# Patient Record
Sex: FEMALE | Race: OTHER | HISPANIC OR LATINO | URBAN - METROPOLITAN AREA
[De-identification: names, ages, dates, MRNs, and addresses within clinical notes are randomized per-mention and may not be internally consistent; named-entity substitution may affect disease eponyms.]

---

## 2023-07-26 ENCOUNTER — EMERGENCY (EMERGENCY)
Facility: HOSPITAL | Age: 33
LOS: 1 days | Discharge: HOME | End: 2023-07-26
Attending: EMERGENCY MEDICINE
Payer: MEDICAID

## 2023-07-26 VITALS
WEIGHT: 158.73 LBS | DIASTOLIC BLOOD PRESSURE: 87 MMHG | OXYGEN SATURATION: 97 % | SYSTOLIC BLOOD PRESSURE: 128 MMHG | TEMPERATURE: 98 F | RESPIRATION RATE: 16 BRPM | HEART RATE: 87 BPM

## 2023-07-26 VITALS
HEART RATE: 74 BPM | TEMPERATURE: 99 F | OXYGEN SATURATION: 99 % | RESPIRATION RATE: 17 BRPM | DIASTOLIC BLOOD PRESSURE: 63 MMHG | SYSTOLIC BLOOD PRESSURE: 103 MMHG

## 2023-07-26 LAB
ALBUMIN SERPL ELPH-MCNC: 3.9 G/DL — SIGNIFICANT CHANGE UP (ref 3.5–5)
ALP SERPL-CCNC: 77 U/L — SIGNIFICANT CHANGE UP (ref 40–120)
ALT FLD-CCNC: 24 U/L DA — SIGNIFICANT CHANGE UP (ref 10–60)
ANION GAP SERPL CALC-SCNC: 9 MMOL/L — SIGNIFICANT CHANGE UP (ref 5–17)
AST SERPL-CCNC: 14 U/L — SIGNIFICANT CHANGE UP (ref 10–40)
BASOPHILS # BLD AUTO: 0.02 K/UL — SIGNIFICANT CHANGE UP (ref 0–0.2)
BASOPHILS NFR BLD AUTO: 0.2 % — SIGNIFICANT CHANGE UP (ref 0–2)
BILIRUB SERPL-MCNC: 0.5 MG/DL — SIGNIFICANT CHANGE UP (ref 0.2–1.2)
BUN SERPL-MCNC: 11 MG/DL — SIGNIFICANT CHANGE UP (ref 7–18)
CALCIUM SERPL-MCNC: 9.1 MG/DL — SIGNIFICANT CHANGE UP (ref 8.4–10.5)
CHLORIDE SERPL-SCNC: 109 MMOL/L — HIGH (ref 96–108)
CO2 SERPL-SCNC: 23 MMOL/L — SIGNIFICANT CHANGE UP (ref 22–31)
CREAT SERPL-MCNC: 0.71 MG/DL — SIGNIFICANT CHANGE UP (ref 0.5–1.3)
EGFR: 114 ML/MIN/1.73M2 — SIGNIFICANT CHANGE UP
EOSINOPHIL # BLD AUTO: 0.05 K/UL — SIGNIFICANT CHANGE UP (ref 0–0.5)
EOSINOPHIL NFR BLD AUTO: 0.6 % — SIGNIFICANT CHANGE UP (ref 0–6)
GLUCOSE SERPL-MCNC: 90 MG/DL — SIGNIFICANT CHANGE UP (ref 70–99)
HCT VFR BLD CALC: 41.2 % — SIGNIFICANT CHANGE UP (ref 34.5–45)
HGB BLD-MCNC: 13.5 G/DL — SIGNIFICANT CHANGE UP (ref 11.5–15.5)
IMM GRANULOCYTES NFR BLD AUTO: 0.2 % — SIGNIFICANT CHANGE UP (ref 0–0.9)
LYMPHOCYTES # BLD AUTO: 1.92 K/UL — SIGNIFICANT CHANGE UP (ref 1–3.3)
LYMPHOCYTES # BLD AUTO: 23 % — SIGNIFICANT CHANGE UP (ref 13–44)
MCHC RBC-ENTMCNC: 27.9 PG — SIGNIFICANT CHANGE UP (ref 27–34)
MCHC RBC-ENTMCNC: 32.8 GM/DL — SIGNIFICANT CHANGE UP (ref 32–36)
MCV RBC AUTO: 85.1 FL — SIGNIFICANT CHANGE UP (ref 80–100)
MONOCYTES # BLD AUTO: 0.56 K/UL — SIGNIFICANT CHANGE UP (ref 0–0.9)
MONOCYTES NFR BLD AUTO: 6.7 % — SIGNIFICANT CHANGE UP (ref 2–14)
NEUTROPHILS # BLD AUTO: 5.79 K/UL — SIGNIFICANT CHANGE UP (ref 1.8–7.4)
NEUTROPHILS NFR BLD AUTO: 69.3 % — SIGNIFICANT CHANGE UP (ref 43–77)
NRBC # BLD: 0 /100 WBCS — SIGNIFICANT CHANGE UP (ref 0–0)
PLATELET # BLD AUTO: 260 K/UL — SIGNIFICANT CHANGE UP (ref 150–400)
POTASSIUM SERPL-MCNC: 3.7 MMOL/L — SIGNIFICANT CHANGE UP (ref 3.5–5.3)
POTASSIUM SERPL-SCNC: 3.7 MMOL/L — SIGNIFICANT CHANGE UP (ref 3.5–5.3)
PROT SERPL-MCNC: 7.8 G/DL — SIGNIFICANT CHANGE UP (ref 6–8.3)
RBC # BLD: 4.84 M/UL — SIGNIFICANT CHANGE UP (ref 3.8–5.2)
RBC # FLD: 12.6 % — SIGNIFICANT CHANGE UP (ref 10.3–14.5)
SODIUM SERPL-SCNC: 141 MMOL/L — SIGNIFICANT CHANGE UP (ref 135–145)
WBC # BLD: 8.36 K/UL — SIGNIFICANT CHANGE UP (ref 3.8–10.5)
WBC # FLD AUTO: 8.36 K/UL — SIGNIFICANT CHANGE UP (ref 3.8–10.5)

## 2023-07-26 PROCEDURE — 70450 CT HEAD/BRAIN W/O DYE: CPT | Mod: MA

## 2023-07-26 PROCEDURE — 82962 GLUCOSE BLOOD TEST: CPT

## 2023-07-26 PROCEDURE — 99285 EMERGENCY DEPT VISIT HI MDM: CPT | Mod: 25

## 2023-07-26 PROCEDURE — 70450 CT HEAD/BRAIN W/O DYE: CPT | Mod: 26,MA

## 2023-07-26 PROCEDURE — 80053 COMPREHEN METABOLIC PANEL: CPT

## 2023-07-26 PROCEDURE — 85025 COMPLETE CBC W/AUTO DIFF WBC: CPT

## 2023-07-26 PROCEDURE — 93005 ELECTROCARDIOGRAM TRACING: CPT

## 2023-07-26 PROCEDURE — 36415 COLL VENOUS BLD VENIPUNCTURE: CPT

## 2023-07-26 RX ORDER — MECLIZINE HCL 12.5 MG
25 TABLET ORAL ONCE
Refills: 0 | Status: COMPLETED | OUTPATIENT
Start: 2023-07-26 | End: 2023-07-26

## 2023-07-26 RX ORDER — ALPRAZOLAM 0.25 MG
0.25 TABLET ORAL ONCE
Refills: 0 | Status: DISCONTINUED | OUTPATIENT
Start: 2023-07-26 | End: 2023-07-26

## 2023-07-26 RX ORDER — MECLIZINE HCL 12.5 MG
1 TABLET ORAL
Qty: 15 | Refills: 0
Start: 2023-07-26

## 2023-07-26 RX ORDER — SODIUM CHLORIDE 9 MG/ML
1000 INJECTION INTRAMUSCULAR; INTRAVENOUS; SUBCUTANEOUS ONCE
Refills: 0 | Status: COMPLETED | OUTPATIENT
Start: 2023-07-26 | End: 2023-07-26

## 2023-07-26 RX ADMIN — Medication 0.25 MILLIGRAM(S): at 08:31

## 2023-07-26 RX ADMIN — SODIUM CHLORIDE 1000 MILLILITER(S): 9 INJECTION INTRAMUSCULAR; INTRAVENOUS; SUBCUTANEOUS at 08:31

## 2023-07-26 RX ADMIN — Medication 25 MILLIGRAM(S): at 08:31

## 2023-07-26 NOTE — ED PROVIDER NOTE - CLINICAL SUMMARY MEDICAL DECISION MAKING FREE TEXT BOX
33-year-old woman, history of hypertension, complains of headache starting about 1 month ago which is generalized and continuous, and 2 to 3 weeks ago developed numerous symptoms including dizziness/vertigo, palpitations, shakiness, occasional shortness of breath, and admits to feeling nervous.  She denies any chest pain/fever/syncope/focal weakness.  She is also asking for CT imaging of brain due to new and persistent headache of 1 month duration--clinical presentation is likely c/w anxiety, but will check labs, CT head, meclizine, offer trial of Xanax PO, reassess.

## 2023-07-26 NOTE — ED PROVIDER NOTE - CARE PLAN
Principal Discharge DX:	Anxiety  Secondary Diagnosis:	Headache  Secondary Diagnosis:	Palpitations  Secondary Diagnosis:	Dizziness   1

## 2023-07-26 NOTE — ED PROVIDER NOTE - NSFOLLOWUPINSTRUCTIONS_ED_ALL_ED_FT
Managing Anxiety, Adult  After being diagnosed with anxiety, you may be relieved to know why you have felt or behaved a certain way. You may also feel overwhelmed about the treatment ahead and what it will mean for your life. With care and support, you can manage this condition.    How to manage lifestyle changes  Managing stress and anxiety    An adult doing mindful breathing while practicing yoga.  Stress is your body's reaction to life changes and events, both good and bad. Most stress will last just a few hours, but stress can be ongoing and can lead to more than just stress. Although stress can play a major role in anxiety, it is not the same as anxiety. Stress is usually caused by something external, such as a deadline, test, or competition. Stress normally passes after the triggering event has ended.    Anxiety is caused by something internal, such as imagining a terrible outcome or worrying that something will go wrong that will devastate you. Anxiety often does not go away even after the triggering event is over, and it can become long-term (chronic) worry. It is important to understand the differences between stress and anxiety and to manage your stress effectively so that it does not lead to an anxious response.    Talk with your health care provider or a counselor to learn more about reducing anxiety and stress. He or she may suggest tension reduction techniques, such as:  Music therapy. Spend time creating or listening to music that you enjoy and that inspires you.  Mindfulness-based meditation. Practice being aware of your normal breaths while not trying to control your breathing. It can be done while sitting or walking.  Centering prayer. This involves focusing on a word, phrase, or sacred image that means something to you and brings you peace.  Deep breathing. To do this, expand your stomach and inhale slowly through your nose. Hold your breath for 3–5 seconds. Then exhale slowly, letting your stomach muscles relax.  Self-talk. Learn to notice and identify thought patterns that lead to anxiety reactions and change those patterns to thoughts that feel peaceful.  Muscle relaxation. Taking time to tense muscles and then relax them.  Choose a tension reduction technique that fits your lifestyle and personality. These techniques take time and practice. Set aside 5–15 minutes a day to do them. Therapists can offer counseling and training in these techniques. The training to help with anxiety may be covered by some insurance plans.    Other things you can do to manage stress and anxiety include:  Keeping a stress diary. This can help you learn what triggers your reaction and then learn ways to manage your response.  Thinking about how you react to certain situations. You may not be able to control everything, but you can control your response.  Making time for activities that help you relax and not feeling guilty about spending your time in this way.  Doing visual imagery. This involves imagining or creating mental pictures to help you relax.  Practicing yoga. Through yoga poses, you can lower tension and promote relaxation.  Medicines    Medicines can help ease symptoms. Medicines for anxiety include:  Antidepressant medicines. These are usually prescribed for long-term daily control.  Anti-anxiety medicines. These may be added in severe cases, especially when panic attacks occur.  Medicines will be prescribed by a health care provider. When used together, medicines, psychotherapy, and tension reduction techniques may be the most effective treatment.    Relationships    Relationships can play a big part in helping you recover. Try to spend more time connecting with trusted friends and family members.  Consider going to couples counseling if you have a partner, taking family education classes, or going to family therapy.  Therapy can help you and others better understand your condition.  How to recognize changes in your anxiety  Everyone responds differently to treatment for anxiety. Recovery from anxiety happens when symptoms decrease and stop interfering with your daily activities at home or work. This may mean that you will start to:  Have better concentration and focus. Worry will interfere less in your daily thinking.  Sleep better.  Be less irritable.  Have more energy.  Have improved memory.  It is also important to recognize when your condition is getting worse. Contact your health care provider if your symptoms interfere with home or work and you feel like your condition is not improving.    Follow these instructions at home:  Activity    Exercise. Adults should do the following:  Exercise for at least 150 minutes each week. The exercise should increase your heart rate and make you sweat (moderate-intensity exercise).  Strengthening exercises at least twice a week.  Get the right amount and quality of sleep. Most adults need 7–9 hours of sleep each night.  Lifestyle    Two adults cook together in a kitchen. Fruit and vegetables are on the counter in front of them.  Eat a healthy diet that includes plenty of vegetables, fruits, whole grains, low-fat dairy products, and lean protein.  Do not eat a lot of foods that are high in fats, added sugars, or salt (sodium).  Make choices that simplify your life.  Do not use any products that contain nicotine or tobacco. These products include cigarettes, chewing tobacco, and vaping devices, such as e-cigarettes. If you need help quitting, ask your health care provider.  Avoid caffeine, alcohol, and certain over-the-counter cold medicines. These may make you feel worse. Ask your pharmacist which medicines to avoid.  General instructions    Take over-the-counter and prescription medicines only as told by your health care provider.  Keep all follow-up visits. This is important.  Where to find support  You can get help and support from these sources:  Self-help groups.  Online and community organizations.  A trusted spiritual leader.  Couples counseling.  Family education classes.  Family therapy.  Where to find more information  You may find that joining a support group helps you deal with your anxiety. The following sources can help you locate counselors or support groups near you:  Mental Health [place]: www.mentalhealthamerica.net  Anxiety and Depression Association of [place] (ADAA): www.adaa.org  National [place] on Mental Illness (SHIEN): www.shine.org  Contact a health care provider if:  You have a hard time staying focused or finishing daily tasks.  You spend many hours a day feeling worried about everyday life.  You become exhausted by worry.  You start to have headaches or frequently feel tense.  You develop chronic nausea or diarrhea.  Get help right away if:  You have a racing heart and shortness of breath.  You have thoughts of hurting yourself or others.  If you ever feel like you may hurt yourself or others, or have thoughts about taking your own life, get help right away. Go to your nearest emergency department or:  Call your local emergency services (975 in the [place]  Call a suicide crisis helpline, such as the National Suicide Prevention Lifeline at 1-469.208.5151 or 565 in the [place] This is open 24 hours a day in the [place]  Text the Crisis Text Line at 837664 (in the [place]  Summary  Taking steps to learn and use tension reduction techniques can help calm you and help prevent triggering an anxiety reaction.  When used together, medicines, psychotherapy, and tension reduction techniques may be the most effective treatment.  Family, friends, and partners can play a big part in supporting you.  This information is not intended to replace advice given to you by your health care provider. Make sure you discuss any questions you have with your health care provider.    Document Revised: 07/13/2022 Document Reviewed: 04/10/2022  Elsevier Patient Education © 2023 Elsevier Inc.

## 2023-07-26 NOTE — ED PROVIDER NOTE - PSYCHIATRIC, MLM
Alert and oriented to person, place, time/situation.  Anxioius mood and affect. no apparent risk to self or others.

## 2023-07-26 NOTE — ED ADULT NURSE NOTE - NSFALLUNIVINTERV_ED_ALL_ED
Bed/Stretcher in lowest position, wheels locked, appropriate side rails in place/Call bell, personal items and telephone in reach/Instruct patient to call for assistance before getting out of bed/chair/stretcher/Non-slip footwear applied when patient is off stretcher/[place] to call system/Physically safe environment - no spills, clutter or unnecessary equipment/Purposeful proactive rounding/Room/bathroom lighting operational, light cord in reach

## 2023-07-26 NOTE — ED ADULT NURSE REASSESSMENT NOTE - NS ED NURSE REASSESS COMMENT FT1
used# [id number] Farshad. Pt resting in bed, A&OX3, awaiting blood work results. No acute distress noted.

## 2023-07-26 NOTE — ED PROVIDER NOTE - OBJECTIVE STATEMENT
33-year-old woman, history of hypertension, complains of headache starting about 1 month ago which is generalized and continuous, and 2 to 3 weeks ago developed numerous symptoms including dizziness/vertigo, palpitations, shakiness, occasional shortness of breath, and admits to feeling nervous.  She denies any chest pain/fever/syncope/focal weakness.  She has not previously had the symptoms.  She denies any specific psychosocial stressor or inciting event.  Denies any suicidal ideation or attempt.  She went to [facility] in the past week and was prescribed Tylenol and ibuprofen but work-up was negative.  She is also asking for CT imaging of brain due to new and persistent headache of 1 month duration. 33-year-old woman, history of hypertension, complains of headache starting about 1 month ago which is generalized and continuous, and 2 to 3 weeks ago developed numerous symptoms including dizziness/vertigo, palpitations, shakiness, occasional shortness of breath and vomiting, and admits to feeling nervous.  She denies any chest pain/fever/syncope/focal weakness.  She has not previously had the symptoms.  She denies any specific psychosocial stressor or inciting event.  Denies any suicidal ideation or attempt.  She went to [facility] in the past week and was prescribed Tylenol and ibuprofen but work-up was negative.  She is also asking for CT imaging of brain due to new and persistent headache of 1 month duration.

## 2023-07-26 NOTE — ED ADULT NURSE NOTE - OBJECTIVE STATEMENT
came to ED due to headache x 3 weeks with dizziness, was in [place] weeks ago and was Rx meds but not helping and unable to sleep for nights .

## 2023-07-26 NOTE — ED PROVIDER NOTE - PATIENT PORTAL LINK FT
You can access the FollowMyHealth Patient Portal offered by [facility] by registering at the following website: [email or web address]. By joining [facility]’s FollowMyHealth portal, you will also be able to view your health information using other applications (apps) compatible with our system.

## 2023-07-26 NOTE — ED PROVIDER NOTE - DATE/TIME 1
"Chief Complaint   Patient presents with     Diabetes     Flu Shot       Initial /64 (BP Location: Left arm, Patient Position: Sitting, Cuff Size: Adult Regular)   Pulse 73   Temp 96.9  F (36.1  C) (Tympanic)   Resp 18   Ht 1.753 m (5' 9\")   Wt 103.9 kg (229 lb)   SpO2 97%   BMI 33.82 kg/m   Estimated body mass index is 33.82 kg/m  as calculated from the following:    Height as of this encounter: 1.753 m (5' 9\").    Weight as of this encounter: 103.9 kg (229 lb).  Medication Reconciliation: complete  Rica Dillon LPN  " 26-Jul-2023 09:48

## 2023-07-28 ENCOUNTER — EMERGENCY (EMERGENCY)
Facility: HOSPITAL | Age: 33
LOS: 1 days | Discharge: HOME | End: 2023-07-28
Attending: EMERGENCY MEDICINE
Payer: MEDICAID

## 2023-07-28 VITALS
HEART RATE: 94 BPM | DIASTOLIC BLOOD PRESSURE: 82 MMHG | WEIGHT: 149.91 LBS | HEIGHT: 67 IN | SYSTOLIC BLOOD PRESSURE: 120 MMHG | TEMPERATURE: 98 F | OXYGEN SATURATION: 100 % | RESPIRATION RATE: 18 BRPM

## 2023-07-28 VITALS
DIASTOLIC BLOOD PRESSURE: 85 MMHG | SYSTOLIC BLOOD PRESSURE: 110 MMHG | OXYGEN SATURATION: 99 % | RESPIRATION RATE: 18 BRPM | HEART RATE: 91 BPM | TEMPERATURE: 99 F

## 2023-07-28 DIAGNOSIS — F43.22 ADJUSTMENT DISORDER WITH ANXIETY: ICD-10-CM

## 2023-07-28 PROBLEM — I10 ESSENTIAL (PRIMARY) HYPERTENSION: Chronic | Status: ACTIVE | Noted: 2023-07-26

## 2023-07-28 LAB
ALBUMIN SERPL ELPH-MCNC: 4.2 G/DL — SIGNIFICANT CHANGE UP (ref 3.5–5)
ALP SERPL-CCNC: 84 U/L — SIGNIFICANT CHANGE UP (ref 40–120)
ALT FLD-CCNC: 25 U/L DA — SIGNIFICANT CHANGE UP (ref 10–60)
AMPHET UR-MCNC: NEGATIVE — SIGNIFICANT CHANGE UP
ANION GAP SERPL CALC-SCNC: 10 MMOL/L — SIGNIFICANT CHANGE UP (ref 5–17)
APPEARANCE UR: CLEAR — SIGNIFICANT CHANGE UP
AST SERPL-CCNC: 12 U/L — SIGNIFICANT CHANGE UP (ref 10–40)
BARBITURATES UR SCN-MCNC: NEGATIVE — SIGNIFICANT CHANGE UP
BASOPHILS # BLD AUTO: 0.03 K/UL — SIGNIFICANT CHANGE UP (ref 0–0.2)
BASOPHILS NFR BLD AUTO: 0.4 % — SIGNIFICANT CHANGE UP (ref 0–2)
BENZODIAZ UR-MCNC: NEGATIVE — SIGNIFICANT CHANGE UP
BILIRUB SERPL-MCNC: 0.5 MG/DL — SIGNIFICANT CHANGE UP (ref 0.2–1.2)
BILIRUB UR-MCNC: NEGATIVE — SIGNIFICANT CHANGE UP
BUN SERPL-MCNC: 13 MG/DL — SIGNIFICANT CHANGE UP (ref 7–18)
CALCIUM SERPL-MCNC: 9.3 MG/DL — SIGNIFICANT CHANGE UP (ref 8.4–10.5)
CHLORIDE SERPL-SCNC: 105 MMOL/L — SIGNIFICANT CHANGE UP (ref 96–108)
CO2 SERPL-SCNC: 23 MMOL/L — SIGNIFICANT CHANGE UP (ref 22–31)
COCAINE METAB.OTHER UR-MCNC: NEGATIVE — SIGNIFICANT CHANGE UP
COLOR SPEC: YELLOW — SIGNIFICANT CHANGE UP
CREAT SERPL-MCNC: 0.72 MG/DL — SIGNIFICANT CHANGE UP (ref 0.5–1.3)
DIFF PNL FLD: NEGATIVE — SIGNIFICANT CHANGE UP
EGFR: 112 ML/MIN/1.73M2 — SIGNIFICANT CHANGE UP
EOSINOPHIL # BLD AUTO: 0.03 K/UL — SIGNIFICANT CHANGE UP (ref 0–0.5)
EOSINOPHIL NFR BLD AUTO: 0.4 % — SIGNIFICANT CHANGE UP (ref 0–6)
ETHANOL SERPL-MCNC: <3 MG/DL — SIGNIFICANT CHANGE UP (ref 0–10)
GLUCOSE SERPL-MCNC: 96 MG/DL — SIGNIFICANT CHANGE UP (ref 70–99)
GLUCOSE UR QL: NEGATIVE — SIGNIFICANT CHANGE UP
HCG UR QL: NEGATIVE — SIGNIFICANT CHANGE UP
HCT VFR BLD CALC: 42.8 % — SIGNIFICANT CHANGE UP (ref 34.5–45)
HGB BLD-MCNC: 14 G/DL — SIGNIFICANT CHANGE UP (ref 11.5–15.5)
IMM GRANULOCYTES NFR BLD AUTO: 0.3 % — SIGNIFICANT CHANGE UP (ref 0–0.9)
KETONES UR-MCNC: NEGATIVE — SIGNIFICANT CHANGE UP
LEUKOCYTE ESTERASE UR-ACNC: NEGATIVE — SIGNIFICANT CHANGE UP
LYMPHOCYTES # BLD AUTO: 1.14 K/UL — SIGNIFICANT CHANGE UP (ref 1–3.3)
LYMPHOCYTES # BLD AUTO: 15.9 % — SIGNIFICANT CHANGE UP (ref 13–44)
MCHC RBC-ENTMCNC: 28.2 PG — SIGNIFICANT CHANGE UP (ref 27–34)
MCHC RBC-ENTMCNC: 32.7 GM/DL — SIGNIFICANT CHANGE UP (ref 32–36)
MCV RBC AUTO: 86.1 FL — SIGNIFICANT CHANGE UP (ref 80–100)
METHADONE UR-MCNC: NEGATIVE — SIGNIFICANT CHANGE UP
MONOCYTES # BLD AUTO: 0.48 K/UL — SIGNIFICANT CHANGE UP (ref 0–0.9)
MONOCYTES NFR BLD AUTO: 6.7 % — SIGNIFICANT CHANGE UP (ref 2–14)
NEUTROPHILS # BLD AUTO: 5.47 K/UL — SIGNIFICANT CHANGE UP (ref 1.8–7.4)
NEUTROPHILS NFR BLD AUTO: 76.3 % — SIGNIFICANT CHANGE UP (ref 43–77)
NITRITE UR-MCNC: NEGATIVE — SIGNIFICANT CHANGE UP
NRBC # BLD: 0 /100 WBCS — SIGNIFICANT CHANGE UP (ref 0–0)
OPIATES UR-MCNC: NEGATIVE — SIGNIFICANT CHANGE UP
PCP SPEC-MCNC: SIGNIFICANT CHANGE UP
PCP UR-MCNC: NEGATIVE — SIGNIFICANT CHANGE UP
PH UR: 6 — SIGNIFICANT CHANGE UP (ref 5–8)
PLATELET # BLD AUTO: 297 K/UL — SIGNIFICANT CHANGE UP (ref 150–400)
POTASSIUM SERPL-MCNC: 4.4 MMOL/L — SIGNIFICANT CHANGE UP (ref 3.5–5.3)
POTASSIUM SERPL-SCNC: 4.4 MMOL/L — SIGNIFICANT CHANGE UP (ref 3.5–5.3)
PROT SERPL-MCNC: 8.1 G/DL — SIGNIFICANT CHANGE UP (ref 6–8.3)
PROT UR-MCNC: NEGATIVE — SIGNIFICANT CHANGE UP
RBC # BLD: 4.97 M/UL — SIGNIFICANT CHANGE UP (ref 3.8–5.2)
RBC # FLD: 12.3 % — SIGNIFICANT CHANGE UP (ref 10.3–14.5)
SODIUM SERPL-SCNC: 138 MMOL/L — SIGNIFICANT CHANGE UP (ref 135–145)
SP GR SPEC: 1.01 — SIGNIFICANT CHANGE UP (ref 1.01–1.02)
THC UR QL: NEGATIVE — SIGNIFICANT CHANGE UP
TSH SERPL-MCNC: 0.57 UU/ML — SIGNIFICANT CHANGE UP (ref 0.34–4.82)
UROBILINOGEN FLD QL: NEGATIVE — SIGNIFICANT CHANGE UP
WBC # BLD: 7.17 K/UL — SIGNIFICANT CHANGE UP (ref 3.8–10.5)
WBC # FLD AUTO: 7.17 K/UL — SIGNIFICANT CHANGE UP (ref 3.8–10.5)

## 2023-07-28 PROCEDURE — 36415 COLL VENOUS BLD VENIPUNCTURE: CPT

## 2023-07-28 PROCEDURE — 99285 EMERGENCY DEPT VISIT HI MDM: CPT

## 2023-07-28 PROCEDURE — 81003 URINALYSIS AUTO W/O SCOPE: CPT

## 2023-07-28 PROCEDURE — 90792 PSYCH DIAG EVAL W/MED SRVCS: CPT | Mod: 95

## 2023-07-28 PROCEDURE — 81025 URINE PREGNANCY TEST: CPT

## 2023-07-28 PROCEDURE — 93005 ELECTROCARDIOGRAM TRACING: CPT

## 2023-07-28 PROCEDURE — 80053 COMPREHEN METABOLIC PANEL: CPT

## 2023-07-28 PROCEDURE — 80307 DRUG TEST PRSMV CHEM ANLYZR: CPT

## 2023-07-28 PROCEDURE — 84443 ASSAY THYROID STIM HORMONE: CPT

## 2023-07-28 PROCEDURE — 85025 COMPLETE CBC W/AUTO DIFF WBC: CPT

## 2023-07-28 NOTE — ED BEHAVIORAL HEALTH ASSESSMENT NOTE - REFERRAL / APPOINTMENT DETAILS
resources faxed over to ED to be shared with patient to assist with getting connected to outpatient psychiatric care

## 2023-07-28 NOTE — ED PROVIDER NOTE - PATIENT PORTAL LINK FT
You can access the FollowMyHealth Patient Portal offered by [facility] by registering at the following website: [email or web address]. By joining [facility]’s FollowMyHealth portal, you will also be able to view your health information using other applications (apps) compatible with our system.

## 2023-07-28 NOTE — ED BEHAVIORAL HEALTH ASSESSMENT NOTE - SUMMARY
Zahida is a 32 y/o Danish-speaking F ( #[id number] and #[id number] utilized), single, unemployed, living alone, non caregiver/no dependents, no PMH, with no PPH (no history of SA or NSSIB, no history of psychiatric ED presentations, no history of psychiatric hospitalizations, no outpatient treatment history, no history of psychiatric medication trials, no history of substance or EtOH use disorders, no history of violence/aggression), self-presenting with complaint of insomnia and anxiety. Patient is not endorsing any acutely dangerous psychiatric symptoms or any symptoms consistent with an acute mood or psychotic disorder that would be amenable to inpatient treatment. Patient is also not exhibiting any acutely dangerous behaviors. Discussed option of voluntary psychiatric hospitalization and she declines. Furthermore, there are no acute safety concerns at this time.  Patient does not meet criteria for involuntary psychiatric hospitalization. Presentation is most consistent at this time with Adjustment Disorder.

## 2023-07-28 NOTE — ED PROVIDER NOTE - PROGRESS NOTE DETAILS
33 y.o. female LMP 6/26, c/o feeling dizzy, HA, not sleeping for past 3 weeks.  Pt given tylenol & meds for dizziness.  Pt's stress out 2/2 not able to sleep, denies HI/SI/ visual/auditory hallucination, feeling depressed, scared, crying spells.  Pt wants to speak with Psych.  This is pt's 1st episode.  Telepsych consulted, will see pt Intact Seen by telepsych.  Pt does not required Psych admission.  Outpt resouces given to pt to f/u

## 2023-07-28 NOTE — ED BEHAVIORAL HEALTH ASSESSMENT NOTE - HPI (INCLUDE ILLNESS QUALITY, SEVERITY, DURATION, TIMING, CONTEXT, MODIFYING FACTORS, ASSOCIATED SIGNS AND SYMPTOMS)
MIGRAINE DISABILITY ASSESSMENT (MIDAS)    On how many days in the last 3 months did you miss work or school because of your headaches?  0    How many days in the last 3 months was your productivity at work or school reduced by half or more because of your headaches? (Do not include days you counted in question 1 where you missed work or school.)  0    On how many days in the last 3 months did you not do household work (such as housework, home repairs and maintenance, shopping, caring for children and relatives) because of your headaches?  3    How many days in the last 3 months was your productivity in household work reduced by half or more because of your headaches? (Do not include days you counted in question 3 where you did not do household work).  3     On how many days in the last 3 months did you miss family, social, or lesiure activities because of your headaches?  3    MIDAS Total Score: 9    On how many days in the last 3 months did you have a headache? (If a headache lasted more than 1 day, count each day.)   1    On a scale of 0 - 10, on average how painful were these headaches (where 0 = no pain at all, and 10 = pain as bad as it can be.)  7   Zahida is a 32 y/o Tajik-speaking F ( #[id number] and #[id number] utilized), single, unemployed, living alone, non caregiver/no dependents, no PMH, with no PPH (no history of SA or NSSIB, no history of psychiatric ED presentations, no history of psychiatric hospitalizations, no outpatient treatment history, no history of psychiatric medication trials, no history of substance or EtOH use disorders, no history of violence/aggression), self-presenting with complaint of insomnia and anxiety. Patient reports for the past 3 weeks she has been experiencing worsening dizziness. She has attempted to get help with the PCP and going to the ED at least 2 times, but her dizziness she says has not resolved. As a result she is having more anxiety, thinking about "the worst case scenario". Says she was "very relieved" after a physician in the ED agreed to do a head CT and that the results were "normal". Still she says as result of the anxiety she has been having more difficulty with insomnia. She adamantly denies any suicidal ideation, intent or plan. Denies any history of SA or NSSIB. Denies any homicidal/aggressive ideation. Reports increase anxiety over the last couple weeks as a result of her physical symptoms. Endorses poor sleep and appetite as well. Denies any symptoms concerning for zaheer/hypomania. Denies AH/VH/paranoid ideation. Denies any other perceptual disturbances. No delusions elicited on exam. Denies significant anxiety symptoms. Denies panic attacks. Denies symptoms consistent with OCD. Denies trauma history or any symptoms consistent with PTSD. Denies nicotine use. Denies marijuana use. Denies any other illicit substance use. Denies EtOH use. When asked to provide collateral contacts, she offers the names of her emergency contacts: Vladimir Jones ([phone]) and Paulie Barney ([phone]). Gives permission to speak to them. In both cases, a VM was left requesting a call back.

## 2023-07-28 NOTE — ED PROVIDER NOTE - NSFOLLOWUPINSTRUCTIONS_ED_ALL_ED_FT
Trastorno de adaptación en los adultos  Adjustment Disorder, Adult  El trastorno de adaptación es un al de síntomas que pueden manifestarse después de un acontecimiento de cici estresante, berenice perder un empleo o tener matheus enfermedad física grave. Los síntomas pueden afectar la manera de sentir, pensar y actuar. También pueden interferir con las relaciones.    El trastorno de adaptación aumenta el riesgo de suicidio y abuso de sustancias. Si el trastorno de adaptación no se trata pronto, puede empeorar las afecciones médicas que la persona ya padece. Si el acontecimiento de cici estresante persiste, el trastorno puede continuar y convertirse en matheus forma persistente de trastorno de adaptación.    ¿Cuáles son las causas?  Esta afección se debe a la dificultad para recuperarse o afrontar un evento estresante de la cici.    ¿Qué incrementa el riesgo?  Es más probable que sufra esta afección si:  Ha tenido problemas previos para lidiar con factores estresantes de la cici.  Está realizando un tratamiento por matheus enfermedad a david plazo (crónica).  Está realizando un tratamiento por matheus enfermedad incurable (enfermedad terminal).  Tiene antecedentes familiares de enfermedades mentales.  ¿Cuáles son los signos o síntomas?  Los síntomas de esta afección incluyen:  Síntomas conductuales, berenice:  Dificultad para realizar las tareas diarias.  Imprudencia al conducir.  Desempeño laboral deficiente.  Ignorar las facturas que le llegan.  Evitar a la osvaldo y los amigos.  Acciones impulsivas.  Síntomas emocionales, berenice:  Tristeza, depresión o episodios de llanto.  Preocuparse mucho, o sentirse nervioso o ansioso.  No disfrutar.  Sentimientos de pérdida o desesperanza.  Irritabilidad.  Pensamientos suicidas.  Síntomas físicos, berenice:  Cambios en el apetito o en el peso.  Quejarse de sentirse enfermo sin estarlo.  Sentirse confundido o desconectado.  Pesadillas.  Dificultad para dormir.  Los síntomas de esta afección aparecen en un plazo de 3 meses a partir del acontecimiento estresante. No rogers más de 6 meses, eva que los acontecimientos estresantes dominic por más tiempo. El duelo tras el fallecimiento de un ser querido no es un síntoma de esta afección.    ¿Cómo se diagnostica?  Para diagnosticar esta afección, el médico le preguntará qué ha sucedido en erazo cici y cómo esto lo ha afectado. También puede preguntarle sobre antonette antecedentes médicos y erazo consumo de medicamentos, alcohol y otras sustancias. El médico puede realizarle un examen físico e indicarle análisis de laboratorio u otros estudios. Pueden derivarlo a un especialista en liv mental.    ¿Cómo se trata?    Las opciones de tratamiento de esta afección incluyen lo siguiente:  Apoyo psicológico o psicoterapia. La psicoterapia, generalmente, está a cargo de especialistas en liv mental. Esta terapia puede ser individual o puede implicar a miembros de la osvaldo.  Medicamentos. Determinados medicamentos pueden ayudar a tratar la depresión, la ansiedad y el sueño.  Grupos de apoyo. Estos grupos brindan apoyo emocional, consejos y orientación. Están conformados por personas que tuvieron experiencias similares.  Observación y tiempo. A veces, se conoce berenice conducta expectante. En red tratamiento, el médico controla erazo liv y comportamiento sin otro tratamiento. El trastorno de adaptación a veces mejora por sí solo con el paso del tiempo.  Siga estas instrucciones en erazo casa:  Use los medicamentos de venta kurt y los recetados solamente berenice se lo haya indicado el médico.  Cumpla con todas las visitas de seguimiento. [place] es importante.  Póngase en contacto con familiares y amigos de confianza para obtener apoyo. Hágales saber qué le está pasando y cómo pueden ayudarlo.  Comuníquese con un médico si:  Los síntomas no mejoran tras 6 meses.  Antonette síntomas empeoran.  Solicite ayuda de inmediato si:  Piensa seriamente en lastimarse a usted mismo o a otra persona.  Si alguna vez siente que puede lastimarse o lastimar a otras personas, o tiene pensamientos de poner fin a erazo cici, busque ayuda de inmediato. Diríjase al servicio de urgencias más cercano o:  Comuníquese con el servicio de emergencias de erazo localidad (911 en los [place]).  Llame a matheus línea de asistencia al suicida y atención en crisis berenice National Suicide Prevention Lifeline (Línea Nacional de Prevención del Suicidio) al 1-831.666.7346. Está disponible las 24 horas del día en los [place].  Envíe un mensaje de texto a la línea para casos de crisis al [id number] (en los [place]  Resumen  El trastorno de adaptación es un al de síntomas que pueden manifestarse después de un acontecimiento de cici estresante, berenice perder un empleo o tener matheus enfermedad física grave. Los síntomas pueden afectar la manera de sentir, pensar y actuar. Pueden interferir con las relaciones.  Los síntomas de esta afección aparecen en un plazo de 3 meses a partir del acontecimiento estresante. No rogers más de 6 meses, eva que los acontecimientos estresantes dominic por más tiempo.  El tratamiento puede consistir en psicoterapia, medicamentos, participación en grupos de apoyo u observación para determinar si los síntomas mejoran.  Comuníquese con el médico si antonette síntomas empeoran o no mejoran tras 6 meses.  Si alguna vez siente que puede lastimarse o lastimar a otras personas, o tiene pensamientos de poner fin a erazo cici, busque ayuda de inmediato.  Esta información no tiene berenice fin reemplazar el consejo del médico. Asegúrese de hacerle al médico cualquier pregunta que tenga.      Insomnio  Insomnia  El insomnio es un trastorno del sueño que causa dificultades para conciliar el sueño o para mantenerlo. Puede producir fatiga, falta de energía, dificultad para concentrarse, cambios en el estado de ánimo y mal rendimiento escolar o laboral.    Hay david formas diferentes de clasificar el insomnio:  Dificultad para conciliar el sueño.  Dificultad para mantener el sueño.  Despertar muy precoz por la mañana.  Cualquier tipo de insomnio puede ser a david plazo (crónico) o a corto plazo (kandis). Ambos son frecuentes. Generalmente, el insomnio a corto plazo dura 3 meses o menos tiempo. El insomnio crónico ocurre al menos david veces por semana will más de 3 meses.    ¿Cuáles son las causas?  El insomnio puede deberse a otra afección, situación o sustancia, por ejemplo:  Tener ciertas afecciones de liv mental, berenice ansiedad y depresión.  Consumir cafeína, alcohol, tabaco o drogas.  Tener afecciones gastrointestinales, berenice enfermedad de reflujo gastroesofágico (ERGE).  Tener ciertas afecciones. Estas incluyen las siguientes:  Asma.  Enfermedad de Alzheimer.  Accidente cerebrovascular.  Dolor crónico.  Hiperactividad de la glándula tiroidea (hipertiroidismo).  Otros trastornos del sueño, berenice síndrome de las piernas inquietas y apnea del sueño.  Menopausia.  En ocasiones, la causa del insomnio puede ser desconocida.    ¿Qué incrementa el riesgo?  Los factores de riesgo de tener insomnio incluyen lo siguiente:  Sexo. Esta afección es más frecuente en las mujeres que en los hombres.  Edad. El insomnio es más frecuente a medida que las personas envejecen.  El estrés y ciertas afecciones de liv mental y médicas.  Falta de actividad física.  Tener un horario de trabajo irregular. [place] puede incluir trabajar turnos nocturnos y viajar entre diferentes zonas horarias.  ¿Cuáles son los signos o síntomas?  Si tiene insomnio, el síntoma principal es la dificultad para conciliar el sueño o mantenerlo. [place] puede derivar en otros síntomas, por ejemplo:  Sentirse cansado o tener poca energía.  Ponerse nervioso por tener que irse a dormir.  No sentirse descansado por la mañana.  Tener dificultad para concentrarse.  Sentirse irritable, ansioso o deprimido.  ¿Cómo se diagnostica?  Esta afección se puede diagnosticar en función de lo siguiente:  Los síntomas y antecedentes médicos. El médico puede hacerle preguntas sobre:  Hábitos de sueño.  Cualquier afección médica que tenga.  La liv mental.  Un examen físico.  ¿Cómo se trata?  El tratamiento para el insomnio depende de la causa. El tratamiento puede centrarse en tratar matheus afección subyacente que causa el insomnio. El tratamiento también puede incluir lo siguiente:  Medicamentos que lo ayuden a dormir.  Asesoramiento psicológico o terapia.  Ajustes en el estilo de cici para ayudarlo a dormir mejor.  Siga estas indicaciones en erazo casa:  Comida y bebida    A sign showing that a person should not drink alcohol.  Limite o evite el consumo de alcohol, bebidas con cafeína y productos que contengan nicotina y tabaco, especialmente cerca de la hora de acostarse. Estos productos pueden perturbarle el sueño.  No consuma matheus comida suculenta ni coma alimentos condimentados awilda antes de la hora de acostarse. [place] puede causarle molestias digestivas y dificultades para dormir.  Hábitos de sueño    A person writing in a diary.  Lleve un registro del sueño ya que podría ser de utilidad para que usted y a erazo médico puedan determinar qué podría estar causándole insomnio. Escriba los siguientes datos:  Cuándo duerme.  Cuándo se despierta will la noche.  Qué tan roscoe duerme y si se siente descansado al día siguiente.  Cualquier efecto secundario de los medicamentos que nara.  Lo que usted come y kayley.  Convierta erazo habitación en un lugar oscuro, cómodo donde sea fácil conciliar el sueño.  Coloque persianas o franklin oscuras que impidan la entrada [place] del exterior.  Para bloquear los ruidos, use un aparato que reproduzca sonidos ambientales o relajantes de fondo.  Mantenga baja la temperatura.  Limite el uso de pantallas antes de la hora de acostarse. [place] incluye:  No sven televisión.  No usar el teléfono inteligente, la tableta o la computadora.  Siga matheus rutina que incluya ir a dormir y despertarse a la misma hora cada día y noche. [place] puede ayudarlo a conciliar el sueño más rápidamente. Considere realizar matheus actividad tranquila, berenice leer, e incorporarla berenice parte de la rutina a la hora de irse a dormir.  Trate de evitar fred siestas will el día para que pueda dormir mejor por la noche.  Levántese de la cama si sigue despierto después de 15 minutos de aryan intentado dormirse. Mantenga bajas las luces, jennifer intente leer o hacer matheus actividad tranquila. Cuando tenga sueño, regrese a la cama.  Indicaciones generales    Use los medicamentos de venta kurt y los recetados solamente berenice se lo haya indicado el médico.  Aleah actividad física habitualmente berenice se lo haya indicado el médico. Sin embargo, evite hacer actividad física las horas antes de acostarse.  Utilice técnicas de relajación para controlar el estrés. Pídale al médico que le sugiera algunas técnicas que sandy adecuadas para usted. Pueden incluir:  Ejercicios de respiración.  Rutinas para aliviar la tensión muscular.  Visualización de escenas apacibles.  Conduzca con cuidado. No conduzca si se siente muy somnoliento.  Concurra a todas las visitas de seguimiento. [place] es importante.  Comuníquese con un médico si:  Está cansado will todo el día.  Tiene dificultad en erazo rutina diaria debido a la somnolencia.  Sigue teniendo problemas para dormir o estos empeoran.  Solicite ayuda de inmediato si:  Piensa en lastimarse a usted mismo o a otra persona.  Busque ayuda de inmediato si alguna vez siente que puede hacerse daño a usted mismo o a otros, o tiene pensamientos de poner fin a erazo cici. Diríjase al centro de urgencias más cercano o:  Llame al 911.  Llame a National Suicide Prevention Lifeline (Línea Tele[employer]ica Nacional para la Prevención del Suicidio) al 1-599.876.2399 o al 988. Está disponible las 24 horas del día.  Envíe un mensaje de texto a la línea para casos de crisis al [id number].  Resumen  El insomnio es un trastorno del sueño que causa dificultades para conciliar el sueño o para mantenerlo.  El insomnio puede ser a david plazo (crónico) o a corto plazo (kandis).  El tratamiento para el insomnio depende de la causa. El tratamiento puede centrarse en tratar matheus afección subyacente que causa el insomnio.  Lleve un registro del sueño ya que podría ser de utilidad para que usted y a erazo médico puedan determinar qué podría estar causándole insomnio.  Esta información no tiene berenice fin reemplazar el consejo del médico. Asegúrese de hacerle al médico cualquier pregunta que tenga.      take Tylenol PM as directed  follow up with Psychiatry or Psychotherapy from resources givento you

## 2023-07-28 NOTE — ED PROVIDER NOTE - OBJECTIVE STATEMENT
patient is a 33 year old female who for the past 3 weeks she has been unable to sleep and can not focus on activities. Feels like she wants to disappear and feels angry and frustrated. She is depressed but not suicidal or homicidal. Was seen by a doctor yesterday who offered her therapy. No known drug allergies

## 2023-07-28 NOTE — ED ADULT NURSE NOTE - NSFALLUNIVINTERV_ED_ALL_ED
Bed/Stretcher in lowest position, wheels locked, appropriate side rails in place/Call bell, personal items and telephone in reach/Instruct patient to call for assistance before getting out of bed/chair/stretcher/Non-slip footwear applied when patient is off stretcher/[place] to call system/Physically safe environment - no spills, clutter or unnecessary equipment/Purposeful proactive rounding/Room/bathroom lighting operational, light cord in reach

## 2023-07-28 NOTE — ED BEHAVIORAL HEALTH ASSESSMENT NOTE - RISK ASSESSMENT
Risk factors: insomnia; anxiety about her physical symptoms (dizziness)  Protective factors: No past psych hospitalizations or ED visits; no history of SA; no past NSSIB; no co-morbid substance use; domiciled status; future-orientation; lack of current suicidality or homicidally; lack of urges to self-harm or engage in NSSIB; identifies various reasons for living; very help-seeking

## 2023-07-28 NOTE — ED ADULT NURSE NOTE - HPI (INCLUDE ILLNESS QUALITY, SEVERITY, DURATION, TIMING, CONTEXT, MODIFYING FACTORS, ASSOCIATED SIGNS AND SYMPTOMS)
Pt stated "she gets scared at night when she goes to sleep, she will not wake up". Pt feeling depressed and feels like she doesn't want to be here anymore.

## 2023-07-28 NOTE — ED BEHAVIORAL HEALTH ASSESSMENT NOTE - DESCRIPTION
Patient arrived in ED and has been calm and cooperative in no acute distress. Telepsychiatry consulted. denies as per HPI

## 2023-07-28 NOTE — ED BEHAVIORAL HEALTH ASSESSMENT NOTE - NSSUICPROTFACT_PSY_ALL_CORE
Identifies reasons for living/Supportive social network of family or friends/Fear of death or the actual act of killing self/Cultural, spiritual and/or moral attitudes against suicide/Beloved pets

## 2023-07-28 NOTE — ED BEHAVIORAL HEALTH ASSESSMENT NOTE - OTHER PAST PSYCHIATRIC HISTORY (INCLUDE DETAILS REGARDING ONSET, COURSE OF ILLNESS, INPATIENT/OUTPATIENT TREATMENT)
Problem: Safety  Goal: Will remain free from falls  Outcome: PROGRESSING AS EXPECTED  Intervention: Implement fall precautions  Note: Fall precautions in place. Bed in lowest position. Non-skid socks in place. Personal possessions within reach. Mobility sign on door. Call light within reach. Pt educated regarding fall prevention and states understanding.       Problem: Infection  Goal: Will remain free from infection  Outcome: PROGRESSING AS EXPECTED  Intervention: Assess signs and symptoms of infection  Note: Pt has a méndez and frequently assessed for any signs of infection.     Problem: Knowledge Deficit  Goal: Knowledge of disease process/condition, treatment plan, diagnostic tests, and medications will improve  Outcome: PROGRESSING AS EXPECTED      denies any PPH; as per HPI

## 2023-07-31 NOTE — ED PROVIDER NOTE - CLINICAL SUMMARY MEDICAL DECISION MAKING FREE TEXT BOX
plan is to do blood test and a psych evaluation (4) patient too young to ambulate or walks frequently

## 2024-10-06 NOTE — ED PROVIDER NOTE - BIRTH SEX
Problem: Prexisting or High Potential for Compromised Skin Integrity  Goal: Skin integrity is maintained or improved  Description: INTERVENTIONS:  - Identify patients at risk for skin breakdown  - Assess and monitor skin integrity  - Assess and monitor nutrition and hydration status  - Monitor labs   - Assess for incontinence   - Turn and reposition patient  - Assist with mobility/ambulation  - Relieve pressure over bony prominences  - Avoid friction and shearing  - Provide appropriate hygiene as needed including keeping skin clean and dry  - Evaluate need for skin moisturizer/barrier cream  - Collaborate with interdisciplinary team   - Patient/family teaching  - Consider wound care consult   Outcome: Progressing     Problem: PAIN - ADULT  Goal: Verbalizes/displays adequate comfort level or baseline comfort level  Description: Interventions:  - Encourage patient to monitor pain and request assistance  - Assess pain using appropriate pain scale  - Administer analgesics based on type and severity of pain and evaluate response  - Implement non-pharmacological measures as appropriate and evaluate response  - Consider cultural and social influences on pain and pain management  - Notify physician/advanced practitioner if interventions unsuccessful or patient reports new pain  Outcome: Progressing     Problem: INFECTION - ADULT  Goal: Absence or prevention of progression during hospitalization  Description: INTERVENTIONS:  - Assess and monitor for signs and symptoms of infection  - Monitor lab/diagnostic results  - Monitor all insertion sites, i.e. indwelling lines, tubes, and drains  - Monitor endotracheal if appropriate and nasal secretions for changes in amount and color  - [place] appropriate cooling/warming therapies per order  - Administer medications as ordered  - Instruct and encourage patient and family to use good hand hygiene technique  - Identify and instruct in appropriate isolation precautions for  identified infection/condition  Outcome: Progressing  Goal: Absence of fever/infection during neutropenic period  Description: INTERVENTIONS:  - Monitor WBC    Outcome: Progressing     Problem: SAFETY ADULT  Goal: Patient will remain free of falls  Description: INTERVENTIONS:  - Educate patient/family on patient safety including physical limitations  - Instruct patient to call for assistance with activity   - Consult OT/PT to assist with strengthening/mobility   - Keep Call bell within reach  - Keep bed low and locked with side rails adjusted as appropriate  - Keep care items and personal belongings within reach  - Initiate and maintain comfort rounds  - Make Fall Risk Sign visible to staff  - Offer Toileting every 2 Hours, in advance of need  - Initiate/Maintain alarm  - Obtain necessary fall risk management equipment:   - Apply yellow socks and bracelet for high fall risk patients  - Consider moving patient to room near nurses station  Outcome: Progressing  Goal: Maintain or return to baseline ADL function  Description: INTERVENTIONS:  -  Assess patient's ability to carry out ADLs; assess patient's baseline for ADL function and identify physical deficits which impact ability to perform ADLs (bathing, care of mouth/teeth, toileting, grooming, dressing, etc.)  - Assess/evaluate cause of self-care deficits   - Assess range of motion  - Assess patient's mobility; develop plan if impaired  - Assess patient's need for assistive devices and provide as appropriate  - Encourage maximum independence but intervene and supervise when necessary  - Involve family in performance of ADLs  - Assess for home care needs following discharge   - Consider OT consult to assist with ADL evaluation and planning for discharge  - Provide patient education as appropriate  Outcome: Progressing  Goal: Maintains/Returns to pre admission functional level  Description: INTERVENTIONS:  - Perform AM-PAC 6 Click Basic Mobility/ Daily Activity  assessment daily.  - Set and communicate daily mobility goal to care team and patient/family/caregiver.   - Collaborate with rehabilitation services on mobility goals if consulted  - Perform Range of Motion 3 times a day.  - Reposition patient every 2 hours.  - Dangle patient 3 times a day  - Stand patient 3 times a day  - Ambulate patient 3 times a day  - Out of bed to chair 3 times a day   - Out of bed for meals 3 times a day  - Out of bed for toileting  - Record patient progress and toleration of activity level   Outcome: Progressing     Problem: DISCHARGE PLANNING  Goal: Discharge to home or other facility with appropriate resources  Description: INTERVENTIONS:  - Identify barriers to discharge w/patient and caregiver  - Arrange for needed discharge resources and transportation as appropriate  - Identify discharge learning needs (meds, wound care, etc.)  - Arrange for interpretive services to assist at discharge as needed  - Refer to Case Management Department for coordinating discharge planning if the patient needs post-hospital services based on physician/advanced practitioner order or complex needs related to functional status, cognitive ability, or social support system  Outcome: Progressing     Problem: Knowledge Deficit  Goal: Patient/family/caregiver demonstrates understanding of disease process, treatment plan, medications, and discharge instructions  Description: Complete learning assessment and assess knowledge base.  Interventions:  - Provide teaching at level of understanding  - Provide teaching via preferred learning methods  Outcome: Progressing     Problem: SAFETY,RESTRAINT: NV/NON-SELF DESTRUCTIVE BEHAVIOR  Goal: Remains free of harm/injury (restraint for non violent/non self-detsructive behavior)  Description: INTERVENTIONS:  - Instruct patient/family regarding restraint use   - Assess and monitor physiologic and psychological status   - Provide interventions and comfort measures to meet  assessed patient needs   - Identify and implement measures to help patient regain control  - Assess readiness for release of restraint   Outcome: Progressing  Goal: Returns to optimal restraint-free functioning  Description: INTERVENTIONS:  - Assess the patient's behavior and symptoms that indicate continued need for restraint  - Identify and implement measures to help patient regain control  - Assess readiness for release of restraint   Outcome: Progressing      Female
